# Patient Record
Sex: MALE | Race: WHITE
[De-identification: names, ages, dates, MRNs, and addresses within clinical notes are randomized per-mention and may not be internally consistent; named-entity substitution may affect disease eponyms.]

---

## 2017-07-11 ENCOUNTER — HOSPITAL ENCOUNTER (OUTPATIENT)
Dept: HOSPITAL 80 - BHFA | Age: 67
End: 2017-07-11
Attending: INTERNAL MEDICINE
Payer: COMMERCIAL

## 2017-07-11 ENCOUNTER — HOSPITAL ENCOUNTER (OUTPATIENT)
Dept: HOSPITAL 80 - FIMAGING | Age: 67
End: 2017-07-11
Attending: RADIOLOGY
Payer: COMMERCIAL

## 2017-07-11 DIAGNOSIS — I25.5: ICD-10-CM

## 2017-07-11 DIAGNOSIS — I71.4: Primary | ICD-10-CM

## 2017-07-11 DIAGNOSIS — I50.22: ICD-10-CM

## 2017-07-11 DIAGNOSIS — Z95.9: ICD-10-CM

## 2017-07-11 LAB
CREAT SERPL-MCNC: 1.2 MG/DL (ref 0.7–1.3)
GFR SERPL CREATININE-BSD FRML MDRD: > 60 ML/MIN/{1.73_M2}

## 2017-07-11 PROCEDURE — 74174 CTA ABD&PLVS W/CONTRAST: CPT

## 2018-02-22 ENCOUNTER — HOSPITAL ENCOUNTER (OUTPATIENT)
Dept: HOSPITAL 80 - BHFA | Age: 68
End: 2018-02-22
Attending: INTERNAL MEDICINE
Payer: COMMERCIAL

## 2018-02-22 DIAGNOSIS — I25.10: Primary | ICD-10-CM

## 2018-02-22 DIAGNOSIS — Z79.899: ICD-10-CM

## 2018-11-12 ENCOUNTER — HOSPITAL ENCOUNTER (OUTPATIENT)
Dept: HOSPITAL 80 - FCATH | Age: 68
Discharge: HOME | End: 2018-11-12
Attending: INTERNAL MEDICINE
Payer: COMMERCIAL

## 2018-11-12 DIAGNOSIS — Z45.02: Primary | ICD-10-CM

## 2018-11-12 DIAGNOSIS — I25.5: ICD-10-CM

## 2018-11-12 LAB
INR PPP: 0.99 (ref 0.83–1.16)
PLATELET # BLD: 157 10^3/UL (ref 150–400)
PROTHROMBIN TIME: 13.3 SEC (ref 12–15)

## 2018-11-12 PROCEDURE — 0JPT0PZ REMOVAL OF CARDIAC RHYTHM RELATED DEVICE FROM TRUNK SUBCUTANEOUS TISSUE AND FASCIA, OPEN APPROACH: ICD-10-PCS | Performed by: INTERNAL MEDICINE

## 2018-11-12 PROCEDURE — 0JH607Z INSERTION OF CARDIAC RESYNCHRONIZATION PACEMAKER PULSE GENERATOR INTO CHEST SUBCUTANEOUS TISSUE AND FASCIA, OPEN APPROACH: ICD-10-PCS | Performed by: INTERNAL MEDICINE

## 2018-11-12 PROCEDURE — C1721 AICD, DUAL CHAMBER: HCPCS

## 2018-11-12 NOTE — PDGENHP
History & Physical


Chief Complaint: here for Gen change


History of Present Illness: Patient with ischemic cardiomyopathy s/p AICD, VT, 

and AICD therapy in the past. His device has reached elective indication.


Pertinent Past, Social, Family History: abdominal aortic aneurysm s/p 

endovascular repair, prior CABG, sleep apnea,


Relevant Physical Exam: Obese man in no acute distress. Neck reveals no JVD or 

carotid bruit. Heart regular rate and rhythm with grade I systolic murmur. 

Lungs are clear auscultation bilaterally, 1+ pitting edema bilaterally.


Cardiorespiratory Assessment: Please see above. the patient has AICD at [facility] 

with history of ischemic cardiomyopathy, ventricular tachycardia with 

appropriate AICD shock. Currently no recent febrile illness. Needs AICD 

generator change. Risks of infection discussed. No true alternative to AICD gen 

change. A working AICD is in his best interest.

## 2018-11-12 NOTE — CPEKG
Test Reason : OPEN

Blood Pressure : ***/*** mmHG

Vent. Rate : 062 BPM     Atrial Rate : 050 BPM

   P-R Int : 171 ms          QRS Dur : 098 ms

    QT Int : 518 ms       P-R-T Axes : 072 -24 128 degrees

   QTc Int : 526 ms

 

Atrial-paced complexes

Multiple ventricular premature complexes

Borderline left axis deviation

Nonspecific T abnormalities, lateral leads

 

Confirmed by Jeremy Mathur ([id number]) on 11/12/2018 3:12:45 PM

 

Referred By:             Confirmed By:Jeremy Mathur

## 2018-11-12 NOTE — PDPROPOC
Sedation Plan of Care


Sedation Plan of Care: vital signs stable, mental status noted, patient 

educated of risks, benefits, alternatives, patient can tolerate sedation


ASA Classification: ASA 3


Planned drugs: fentanyl, midazolam


Mallampati Score: Class 3


Mallampati Reference Image: 





Patient passed 3-3-2 rule?: No

## 2018-11-12 NOTE — CPEKG
Test Reason : OPEN

Blood Pressure : ***/*** mmHG

Vent. Rate : 067 BPM     Atrial Rate : 060 BPM

   P-R Int : 156 ms          QRS Dur : 099 ms

    QT Int : 483 ms       P-R-T Axes : 054 -29 098 degrees

   QTc Int : 510 ms

 

Atrial-paced complexes

Multiple ventricular premature complexes

Borderline left axis deviation

Borderline T wave abnormalities

Borderline prolonged QT interval

 

Confirmed by Jeremy Mathur ([id number]) on 11/12/2018 10:56:09 AM

 

Referred By:             Confirmed By:Jeremy Mathur

## 2018-11-12 NOTE — EPPROC
Electrophysiology Procedure Note: 


PROCEDURE: Dual chamber automatic implantable cardioverter-defibrillator 

replacement.





DATE OF PROCEDURE: 11/12/2018





EXPLANTED DEVICE: BIOTRONIK Lumax 540 DR-T Model #395056 Serial # [id number]





IMPLANTED DEVICE: Itrevia 7 DR-T DF-1 Model #840294 Serial # [id number]





LEADS: Right Atrial: SIEMENS/PACESETTER Model #1488TC-46 Serial #[id number]


   Right Ventricle: SIEMENS/PACESETTER Model #1580-65 Serial [id number]





COMPLICATIONS: None





: Mumtaz Conteh MD





INDICATION AND APPROPRIATE USE CRITERIA: The device is being replaced for [facility] 

alerts that are unable to be suppressed. The original device was placed for 

ischemic cardiomyopathy and reduced EF <40%. (MADIT II criteria)





PROCEDURE IN DETAIL: After informed consent was obtained and n.p.o. status was 

confirmed, the region of the left subclavicular fossa was cleaned, prepped and 

draped in a sterile fashion. Approximately 30 mL of 1% lidocaine was utilized 

for local anesthesia. The skin was sharply incised with a #10 blade. 

Electrocautery and local pressure were used for hemostasis. Sharp and blunt 

dissection was used to access the pacemaker pocket overlying the pectoralis 

major fascia. The pocket was thoroughly flushed and checked for bleeding. 

Hemostasis was established and the old device was removed from the pocket. The 

atrial and ventricular lead set screws were loosened.





The atrial lead serial number was checked and placed in the upper pole lead 

housing of the new pulse generator and set screw firmly applied. The procedure 

was repeated for the RV lead as well as the defibrillator SVC and RV coils. 

Ventricular lead threshold was tested and found to be 1.9 V at 0.4 ms width. R-

wave amplitude was measured at 10.10 mV. Lead impedance was 348 Ohms. Atrial 

lead threshold was tested at 1.0 V at 0.4 ms width. P-wave amplitude was 3.10 mV

, lead impedance was 536 Ohms. Defibrillator shock coil impedance 66 Ohms.  The 

device was placed in the pocket and sutured in place with #0 Ethibond. The skin 

was closed with a 3-layered 3-0 Vicryl, 2-0 Vicryl and 4-0 Monocryl repair with 

excellent wound edge opposition and hemostasis documented.





The device was set with a VT1 monitoring zone of greater than 143 BPM (beats 

per minute) VT2 zone of 182 with ATP (Antitachycardia pacing) X 3 with 3 bursts 

of 10 pulses at 85% with 10 millisecond decrement in between each burst. 

Initial shock will be 25 joules, followed by a 30 Joules shock x 1, and 40 

Joules x 6 if needed. The V fib zone is set at 207 BPM with ATP x 1 only if the 

rhythm meets stability criteria 30 Joules x 1 (12msec difference between cycle 

lengths at maximum) otherwise the device will deliver a 40 Joule shocks x 7 

cycles with alternating polarity between each of those shocks.





The patient returned to the post cath recovery unit in good and stable 

condition where a stat postoperative chest x-ray and EKG will be obtained.





FINAL IMPRESSION: Successful elective dual chamber automatic implantable 

cardioverter-defibrillator/pulse generator replacement without immediate 

complication.


Patient Problems: 


 Problems











Problem Status Onset


 


Abdominal aortic aneurysm greater than 39 mm in diameter Acute  


 


Ventricular tachycardia Acute

## 2019-07-01 ENCOUNTER — HOSPITAL ENCOUNTER (OUTPATIENT)
Dept: HOSPITAL 80 - FIMAGING | Age: 69
End: 2019-07-01
Payer: COMMERCIAL